# Patient Record
Sex: MALE | URBAN - METROPOLITAN AREA
[De-identification: names, ages, dates, MRNs, and addresses within clinical notes are randomized per-mention and may not be internally consistent; named-entity substitution may affect disease eponyms.]

---

## 2020-11-12 ENCOUNTER — OFFICE VISIT (OUTPATIENT)
Dept: PRIMARY CARE CLINIC | Age: 30
End: 2020-11-12

## 2020-11-12 LAB — SARS-COV-2, NAAT: NOT DETECTED

## 2020-11-12 PROCEDURE — 99211 OFF/OP EST MAY X REQ PHY/QHP: CPT | Performed by: NURSE PRACTITIONER

## 2020-11-12 NOTE — PATIENT INSTRUCTIONS
You have received a viral test for COVID-19. Below is education on quarantine per the CDC guidelines. For any symptoms, seek care from your PCP, call [phone] to establish care with a doctor, or go directly to an urgent care or the emergency room. Test results will take 2-7 days and will be sent to you in your [employer] account. If you test positive, you will be contacted via phone. If you test negative, the ONLY communication will be through [address] GO TO [place] AND SIGN UP FOR [employer]  (LOWER LEFT OF THE HOME PAGE)  No test is 100%. If you have symptoms, you should follow the guidance of quarantine as previously stated. You can still be contagious if you have symptoms. Your [facility] will reach out to you if you have a positive result. They will provide you with a return to work date and note. If you were tested for a pre-op, then you should remain in quarantine until your procedure. How do I know if I need to be in quarantine? If you live in a community where COVID-19 is or might be spreading (currently, that is virtually everywhere in the [place])  Be alert for symptoms. Watch for fever, cough, shortness of breath, or other symptoms of COVID-19.  Take your temperature if symptoms develop.  Practice social distancing. Maintain 6 feet of distance from others and stay out of crowded places.  Follow CDC guidance if symptoms develop. If you feel healthy but:   Recently had close contact with a person with COVID-19 you need to Quarantine:   Stay home until 14 days after your last exposure.  Check your temperature twice a day and watch for symptoms of COVID-19.  If possible, stay away from people who are at higher-risk for getting very sick from COVID-19.   Stay Home and Monitor Your Health if you:   Have been diagnosed with COVID-19, or   Are waiting for test results, or   Have cough, fever, or shortness of breath, or symptoms of COVID-19      When You Can